# Patient Record
Sex: MALE | Race: WHITE | NOT HISPANIC OR LATINO
[De-identification: names, ages, dates, MRNs, and addresses within clinical notes are randomized per-mention and may not be internally consistent; named-entity substitution may affect disease eponyms.]

---

## 2017-04-18 ENCOUNTER — MEDICATION RENEWAL (OUTPATIENT)
Age: 22
End: 2017-04-18

## 2017-09-22 ENCOUNTER — MEDICATION RENEWAL (OUTPATIENT)
Age: 22
End: 2017-09-22

## 2018-02-20 ENCOUNTER — RX RENEWAL (OUTPATIENT)
Age: 23
End: 2018-02-20

## 2018-07-26 ENCOUNTER — APPOINTMENT (OUTPATIENT)
Dept: NEUROLOGY | Facility: CLINIC | Age: 23
End: 2018-07-26
Payer: MEDICAID

## 2018-07-26 VITALS
HEIGHT: 69 IN | BODY MASS INDEX: 35.25 KG/M2 | DIASTOLIC BLOOD PRESSURE: 76 MMHG | WEIGHT: 238 LBS | SYSTOLIC BLOOD PRESSURE: 117 MMHG | HEART RATE: 65 BPM

## 2018-07-26 PROCEDURE — 99214 OFFICE O/P EST MOD 30 MIN: CPT

## 2018-07-27 ENCOUNTER — MEDICATION RENEWAL (OUTPATIENT)
Age: 23
End: 2018-07-27

## 2018-07-30 ENCOUNTER — MEDICATION RENEWAL (OUTPATIENT)
Age: 23
End: 2018-07-30

## 2018-07-30 ENCOUNTER — MOBILE ON CALL (OUTPATIENT)
Age: 23
End: 2018-07-30

## 2018-11-26 ENCOUNTER — MEDICATION RENEWAL (OUTPATIENT)
Age: 23
End: 2018-11-26

## 2019-08-02 ENCOUNTER — MEDICATION RENEWAL (OUTPATIENT)
Age: 24
End: 2019-08-02

## 2019-12-05 ENCOUNTER — MEDICATION RENEWAL (OUTPATIENT)
Age: 24
End: 2019-12-05

## 2019-12-09 ENCOUNTER — APPOINTMENT (OUTPATIENT)
Dept: NEUROLOGY | Facility: CLINIC | Age: 24
End: 2019-12-09
Payer: MEDICAID

## 2019-12-09 VITALS — SYSTOLIC BLOOD PRESSURE: 123 MMHG | DIASTOLIC BLOOD PRESSURE: 80 MMHG | HEART RATE: 73 BPM

## 2019-12-09 PROCEDURE — 99214 OFFICE O/P EST MOD 30 MIN: CPT

## 2019-12-09 NOTE — HISTORY OF PRESENT ILLNESS
[Right Handed] : right handed [FreeTextEntry1] : 18 [FreeTextEntry2] : GTC [FreeTextEntry8] : Xanax [de-identified] : 10/2013

## 2019-12-09 NOTE — DISCUSSION/SUMMARY
[Well-controlled] : well-controlled [Generalized] : generalized  [Safety Recommendations] : The patient was advised in regards to the risk of seizures and general seizure safety recommendations including not to be bathing alone, climbing to high places and operating heavy machinery. [Risks Associated with Driving/[place] Law] : As per my usual protocol, the patient was advised in regards to risks and driving privileges associated with the [place] Guidelines.  [Risk of Death] : Risk of death associated with seizures / SUDEP was discussed. [Compliance with Medications] : The importance of compliance with medications was reinforced. [Sleep Hygiene/Sleep Disruption Risks] : Sleep hygiene and the risks of sleep disruption were discussed. [FreeTextEntry1] : 24 year old man with a GTC in the context of Xanax withdrawal. Additional  risk factor FH (father)of epilepsy Doing well\par \par Plan:\par - continue keppra 500/500\par - Counseled in length regarding  marijauna and cutting down  on cigarette use\par - reviewed seizure triggers\par - annual bloodwork\par -Bone density Dexa scan\par -follow up in one year [de-identified] : Provoked

## 2019-12-09 NOTE — PHYSICAL EXAM
[Cranial Nerves Optic (II)] : visual acuity intact bilaterally,  visual fields full to confrontation, pupils equal round and reactive to light [Cranial Nerves Trigeminal (V)] : facial sensation intact symmetrically [Cranial Nerves Oculomotor (III)] : extraocular motion intact [Cranial Nerves Glossopharyngeal (IX)] : tongue and palate midline [Cranial Nerves Accessory (XI - Cranial And Spinal)] : head turning and shoulder shrug symmetric [Cranial Nerves Facial (VII)] : face symmetrical [Cranial Nerves Hypoglossal (XII)] : there was no tongue deviation with protrusion [Motor Tone] : muscle tone was normal in all four extremities [Motor Strength] : muscle strength was normal in all four extremities [Involuntary Movements] : no involuntary movements were seen [Sensation Tactile Decrease] : light touch was intact [Abnormal Walk] : normal gait [Motor Handedness Right-Handed] : the patient is right hand dominant [2+] : Patella left 2+

## 2019-12-09 NOTE — DATA REVIEWED
[FreeTextEntry1] : 10/22/2013 hct\par 1.  No  acute  intracranial  hemorrhage,  large  cortical  infarct  or mass  effect.\par 2.  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

## 2021-06-12 ENCOUNTER — EMERGENCY (EMERGENCY)
Facility: HOSPITAL | Age: 26
LOS: 1 days | Discharge: HOME | End: 2021-06-12
Attending: STUDENT IN AN ORGANIZED HEALTH CARE EDUCATION/TRAINING PROGRAM
Payer: MEDICAID

## 2021-06-12 VITALS
DIASTOLIC BLOOD PRESSURE: 63 MMHG | HEART RATE: 93 BPM | SYSTOLIC BLOOD PRESSURE: 104 MMHG | OXYGEN SATURATION: 98 % | RESPIRATION RATE: 18 BRPM | TEMPERATURE: 99 F | HEIGHT: 70 IN | WEIGHT: 199.96 LBS

## 2021-06-12 DIAGNOSIS — Z98.89 OTHER SPECIFIED POSTPROCEDURAL STATES: Chronic | ICD-10-CM

## 2021-06-12 LAB
ALBUMIN SERPL ELPH-MCNC: 4.4 G/DL — SIGNIFICANT CHANGE UP (ref 3.3–5)
ALP SERPL-CCNC: 61 U/L — SIGNIFICANT CHANGE UP (ref 40–120)
ALT FLD-CCNC: 23 U/L — SIGNIFICANT CHANGE UP (ref 10–45)
ANION GAP SERPL CALC-SCNC: 16 MMOL/L — SIGNIFICANT CHANGE UP (ref 5–17)
AST SERPL-CCNC: 16 U/L — SIGNIFICANT CHANGE UP (ref 10–40)
BILIRUB SERPL-MCNC: 0.2 MG/DL — SIGNIFICANT CHANGE UP (ref 0.2–1.2)
BUN SERPL-MCNC: 19 MG/DL — SIGNIFICANT CHANGE UP (ref 7–23)
CALCIUM SERPL-MCNC: 9.4 MG/DL — SIGNIFICANT CHANGE UP (ref 8.4–10.5)
CHLORIDE SERPL-SCNC: 103 MMOL/L — SIGNIFICANT CHANGE UP (ref 96–108)
CO2 SERPL-SCNC: 19 MMOL/L — LOW (ref 22–31)
CREAT SERPL-MCNC: 1.03 MG/DL — SIGNIFICANT CHANGE UP (ref 0.5–1.3)
GLUCOSE SERPL-MCNC: 104 MG/DL — HIGH (ref 70–99)
MAGNESIUM SERPL-MCNC: 2.3 MG/DL — SIGNIFICANT CHANGE UP (ref 1.6–2.6)
PHOSPHATE SERPL-MCNC: 2.7 MG/DL — SIGNIFICANT CHANGE UP (ref 2.5–4.5)
POTASSIUM SERPL-MCNC: 4.1 MMOL/L — SIGNIFICANT CHANGE UP (ref 3.5–5.3)
POTASSIUM SERPL-SCNC: 4.1 MMOL/L — SIGNIFICANT CHANGE UP (ref 3.5–5.3)
PROT SERPL-MCNC: 6.8 G/DL — SIGNIFICANT CHANGE UP (ref 6–8.3)
SODIUM SERPL-SCNC: 138 MMOL/L — SIGNIFICANT CHANGE UP (ref 135–145)

## 2021-06-12 PROCEDURE — 85025 COMPLETE CBC W/AUTO DIFF WBC: CPT

## 2021-06-12 PROCEDURE — 99283 EMERGENCY DEPT VISIT LOW MDM: CPT

## 2021-06-12 PROCEDURE — 99284 EMERGENCY DEPT VISIT MOD MDM: CPT

## 2021-06-12 PROCEDURE — 80053 COMPREHEN METABOLIC PANEL: CPT

## 2021-06-12 PROCEDURE — 83735 ASSAY OF MAGNESIUM: CPT

## 2021-06-12 PROCEDURE — 84100 ASSAY OF PHOSPHORUS: CPT

## 2021-06-12 NOTE — ED ADULT TRIAGE NOTE - CHIEF COMPLAINT QUOTE
Patient c/o seizure episode at 10pm at home, witness by brother. Patient has hx of epilepsy. No seizure in 3 years.

## 2021-06-12 NOTE — ED ADULT NURSE NOTE - OBJECTIVE STATEMENT
25 year old male presents to ED via EMS s/p seizures. Pt has a history of epilepsy and reports being compliant with his medications. Around 10pm today pt had a sudden grand mal seizure lasting about 2-3 minutes. Unknown if he hit his head but did report being tired, thirsty and a headache. Pt bit his tongue during the seizure and arrived with blood on his shirt- no active bleeding upon assessment- airway clear and unobstructed. Pt last seizure was about 3 years ago. Pt denies chest pain, shortness of breath, nausea, vomiting, diarrhaea, abdominal pain, fever, chills, urinary symptoms, lightheadedness, dizziness, changes in vision

## 2021-06-13 LAB
BASOPHILS # BLD AUTO: 0.05 K/UL — SIGNIFICANT CHANGE UP (ref 0–0.2)
BASOPHILS NFR BLD AUTO: 0.3 % — SIGNIFICANT CHANGE UP (ref 0–2)
EOSINOPHIL # BLD AUTO: 0.16 K/UL — SIGNIFICANT CHANGE UP (ref 0–0.5)
EOSINOPHIL NFR BLD AUTO: 1.1 % — SIGNIFICANT CHANGE UP (ref 0–6)
HCT VFR BLD CALC: 44.5 % — SIGNIFICANT CHANGE UP (ref 39–50)
HGB BLD-MCNC: 15.1 G/DL — SIGNIFICANT CHANGE UP (ref 13–17)
IMM GRANULOCYTES NFR BLD AUTO: 0.9 % — SIGNIFICANT CHANGE UP (ref 0–1.5)
LYMPHOCYTES # BLD AUTO: 2.91 K/UL — SIGNIFICANT CHANGE UP (ref 1–3.3)
LYMPHOCYTES # BLD AUTO: 20 % — SIGNIFICANT CHANGE UP (ref 13–44)
MCHC RBC-ENTMCNC: 28.5 PG — SIGNIFICANT CHANGE UP (ref 27–34)
MCHC RBC-ENTMCNC: 33.9 GM/DL — SIGNIFICANT CHANGE UP (ref 32–36)
MCV RBC AUTO: 84 FL — SIGNIFICANT CHANGE UP (ref 80–100)
MONOCYTES # BLD AUTO: 0.68 K/UL — SIGNIFICANT CHANGE UP (ref 0–0.9)
MONOCYTES NFR BLD AUTO: 4.7 % — SIGNIFICANT CHANGE UP (ref 2–14)
NEUTROPHILS # BLD AUTO: 10.6 K/UL — HIGH (ref 1.8–7.4)
NEUTROPHILS NFR BLD AUTO: 73 % — SIGNIFICANT CHANGE UP (ref 43–77)
NRBC # BLD: 0 /100 WBCS — SIGNIFICANT CHANGE UP (ref 0–0)
PLATELET # BLD AUTO: 260 K/UL — SIGNIFICANT CHANGE UP (ref 150–400)
RBC # BLD: 5.3 M/UL — SIGNIFICANT CHANGE UP (ref 4.2–5.8)
RBC # FLD: 11.9 % — SIGNIFICANT CHANGE UP (ref 10.3–14.5)
WBC # BLD: 14.53 K/UL — HIGH (ref 3.8–10.5)
WBC # FLD AUTO: 14.53 K/UL — HIGH (ref 3.8–10.5)

## 2021-06-13 NOTE — ED PROVIDER NOTE - CLINICAL SUMMARY MEDICAL DECISION MAKING FREE TEXT BOX
26yo male with pmh epilepsy presenting with seizure.  One episode witnessed by friend with postictal states.  No signs of infection on exam.  No head injury, significant trauma.  Will check labs to r/o electrolyte abnormalities.  Monitor and reassess after labs.

## 2021-06-13 NOTE — ED PROVIDER NOTE - PHYSICAL EXAMINATION
General appearance: NAD, conversant, afebrile    HEENT: Atraumatic, + R lateral tongue bite,  MMM, clear oropharynx   Neck: Trachea midline; Full range of motion, supple, no midline tenderness   Pulm: CTA bl, normal respiratory effort and no intercostal retractions, normal work of breathing   CV: RRR, No murmurs, rubs, or gallops   Abdomen: Soft, non-tender, non-distended; no guarding or rebound   Back: No midline tenderness   Extremities: No peripheral edema    Skin: Dry, normal temperature, turgor and texture; no rash   Psych: Appropriate affect, cooperative; alert and oriented to person, place and time    Neuro: CN2-12 grossly intact, A&Ox4, MS +5/5 in UE and LE BL, finger to nose smooth and rapid, gross sensation intact in UE and LE BL, gait smooth and coordinated, negative pronator drift

## 2021-06-13 NOTE — ED PROVIDER NOTE - OBJECTIVE STATEMENT
24yo male with pmh epilepsy presenting with seizure.  States he was watching [facility] fight tonight when he had seizure witnessed by friends.  Generalized convulsions with brief confusion afterwards now back to baseline.  Described as typical for his seizures.  No fever, cough, urinary symptoms, abd pain, n/v.  Denies head injury.  At time of interview patient states he feels much better.  Last seizure 3 years ago.    Neurologist Dr Vickers

## 2021-06-13 NOTE — ED PROVIDER NOTE - PATIENT PORTAL LINK FT
You can access the FollowMyHealth Patient Portal offered by [facility] by registering at the following website: [email or web address]. By joining [facility]’s FollowMyHealth portal, you will also be able to view your health information using other applications (apps) compatible with our system.

## 2021-06-13 NOTE — ED PROVIDER NOTE - NSFOLLOWUPINSTRUCTIONS_ED_ALL_ED_FT
Follow up with your neurologist as soon as possible  Rest, drink plenty of fluids  Advance activity as tolerated  Continue all previously prescribed medications as directed  Follow up with your PMD - bring copies of your results  Return to the ER for multiple recurrent seizures, numbness, weakness, or other new or concerning symptoms

## 2021-06-13 NOTE — ED PROVIDER NOTE - ATTENDING CONTRIBUTION TO CARE
I have personally performed a face to face medical and diagnostic evaluation of the patient. I have discussed with and reviewed the Resident's note and agree with the History, ROS, Physical Exam and MDM unless otherwise indicated. A brief summary of my personal evaluation and impression can be found below.    25M hx of epilepsy presents w a cc of seizure x1 earlier this evening, reports was watching [employer] fight when had seizure witnessed by friends, generalized t/c w confusion thereafter now back to baseline, per pt this episode is c/w prior episodes however has not had seizure in some time, reports compliance w medications has q6 month visits w neuro, reports did bite tongue however did not have episode of urinary incontinence per witnessed did not have significant head trauma during episode, pt reports feels improved on ED arrival, tolerating PO. No recent fevers cough urinary sx rash sob cp or barrera.     All other ROS negative, except as above and as per HPI and ROS section.    VITALS: Initial triage and subsequent vitals have been reviewed by me.  GEN APPEARANCE: WDWN, alert, non-toxic, NAD  HEAD: Atraumatic.  MOUTH: MMM, no tonsillar erythema, swelling or exudates, protecting airway, handling secretions. Trace abrasion to R lateral aspect of tongue, no deep laceration.   EYES: PERRLa, EOMI, vision grossly intact.   NECK: Supple  CV: RRR, S1S2, no c/r/m/g. Cap refill <2 seconds. No bruits.   LUNGS: CTAB. No abnormal breath sounds.  ABDOMEN: Soft, NTND. No guarding or rebound.   MSK/EXT: No spinal or extremity point tenderness. No CVA ttp. Pelvis stable. No peripheral edema.  NEURO: Alert, follows commands. Weight bearing normal. Speech normal. Sensation and motor normal x4 extremities. CN2-12 normal, coordination normal, ambulating normally.  SKIN: Warm, dry and intact. No rash.  PSYCH: Appropriate    Plan/MDM: 25M hx of epilepsy presents w a cc of seizure x1 earlier this evening, reports was watching [employer] fight when had seizure witnessed by friends, generalized t/c w confusion thereafter now back to baseline, per pt this episode is c/w prior episodes however has not had seizure in some time, reports compliance w medications has q6 month visits w neuro, reports did bite tongue however did not have episode of urinary incontinence per witnessed did not have significant head trauma during episode, pt reports feels improved on ED arrival, tolerating PO exam vss non toxic neuro non focal exam ddx c/f likely seizure in known epilepsy pt plan to check labs pt denies recent infectious sx, low c/f severe head trauma as event witnessed and pt well appearing now, mother and pt in agreement w plan to check labs and have close follow up w neuro outpt, mother and pt comfortable w plan for dc thereafter pending labs.

## 2021-06-21 ENCOUNTER — APPOINTMENT (OUTPATIENT)
Dept: NEUROLOGY | Facility: CLINIC | Age: 26
End: 2021-06-21
Payer: MEDICAID

## 2021-06-21 VITALS
HEIGHT: 69 IN | HEART RATE: 69 BPM | SYSTOLIC BLOOD PRESSURE: 120 MMHG | DIASTOLIC BLOOD PRESSURE: 82 MMHG | BODY MASS INDEX: 33.47 KG/M2 | WEIGHT: 226 LBS

## 2021-06-21 PROCEDURE — 99213 OFFICE O/P EST LOW 20 MIN: CPT

## 2021-06-21 NOTE — HISTORY OF PRESENT ILLNESS
[Right Handed] : right handed [FreeTextEntry1] : 18 [FreeTextEntry2] : GTC [FreeTextEntry8] : Xanax [de-identified] : 10/2013

## 2021-06-21 NOTE — DISCUSSION/SUMMARY
[Well-controlled] : well-controlled [Generalized] : generalized  [Risks Associated with D[place] Law] : As per my usual protocol, the patient was advised in regards to risks and driving privileges associated with the [place] Guidelines.  [Safety Recommendations] : The patient was advised in regards to the risk of seizures and general seizure safety recommendations including not to be bathing alone, climbing to high places and operating heavy machinery. [Compliance with Medications] : The importance of compliance with medications was reinforced. [Sleep Hygiene/Sleep Disruption Risks] : Sleep hygiene and the risks of sleep disruption were discussed. [Risk of Death] : Risk of death associated with seizures / SUDEP was discussed. [FreeTextEntry1] : 24 year old man with a GTC in the context of Xanax withdrawal. Additional  risk factor FH (father)of epilepsy Doing well\par \par Plan:\par - continue keppra 500/500, Divlaproex 750mg BID\par - Counseled in length regarding  mariijuana and cutting down  on cigarette use\par - reviewed seizure triggers i.e ETOH consumption, AED compliance (will use pill bis with alarm)\par - annual bloodwork\par -Bone density Dexa scan\par -follow up in one year [de-identified] : Provoked

## 2021-06-21 NOTE — PHYSICAL EXAM
[Cranial Nerves Optic (II)] : visual acuity intact bilaterally,  visual fields full to confrontation, pupils equal round and reactive to light [Cranial Nerves Oculomotor (III)] : extraocular motion intact [Cranial Nerves Trigeminal (V)] : facial sensation intact symmetrically [Cranial Nerves Facial (VII)] : face symmetrical [Cranial Nerves Glossopharyngeal (IX)] : tongue and palate midline [Cranial Nerves Accessory (XI - Cranial And Spinal)] : head turning and shoulder shrug symmetric [Cranial Nerves Hypoglossal (XII)] : there was no tongue deviation with protrusion [Motor Tone] : muscle tone was normal in all four extremities [Motor Strength] : muscle strength was normal in all four extremities [Involuntary Movements] : no involuntary movements were seen [Motor Handedness Right-Handed] : the patient is right hand dominant [Sensation Tactile Decrease] : light touch was intact [Abnormal Walk] : normal gait [2+] : Ankle jerk left 2+

## 2021-12-21 ENCOUNTER — APPOINTMENT (OUTPATIENT)
Dept: NEUROLOGY | Facility: CLINIC | Age: 26
End: 2021-12-21

## 2022-10-16 ENCOUNTER — RX RENEWAL (OUTPATIENT)
Age: 27
End: 2022-10-16

## 2024-02-07 ENCOUNTER — APPOINTMENT (OUTPATIENT)
Dept: NEUROLOGY | Facility: CLINIC | Age: 29
End: 2024-02-07
Payer: MEDICAID

## 2024-02-07 VITALS
BODY MASS INDEX: 31.84 KG/M2 | HEART RATE: 64 BPM | DIASTOLIC BLOOD PRESSURE: 86 MMHG | HEIGHT: 69 IN | SYSTOLIC BLOOD PRESSURE: 135 MMHG | WEIGHT: 215 LBS

## 2024-02-07 PROCEDURE — 99215 OFFICE O/P EST HI 40 MIN: CPT

## 2024-02-07 NOTE — REASON FOR VISIT
[Follow-Up: _____] : a [unfilled] follow-up visit [Parent] : parent Strong peripheral pulses/Capillary refill less/equal to 2 seconds

## 2024-02-07 NOTE — DISCUSSION/SUMMARY
[Well-controlled] : well-controlled [Generalized] : generalized  [[place]isks [place] with D[place]] : As per my usual protocol, the patient was advised in regards to risks and driving privileges associated with the [place].  [Safety Recommendations] : The patient was advised in regards to the risk of seizures and general seizure safety recommendations including not to be bathing alone, climbing to high places and operating heavy machinery. [Compliance with Medications] : The importance of compliance with medications was reinforced. [Sleep Hygiene/Sleep Disruption Risks] : Sleep hygiene and the risks of sleep disruption were discussed. [Risk of Death] : Risk of death associated with seizures / SUDEP was discussed. [FreeTextEntry1] : 28 year old man with a GTC in the context of Xanax withdrawal. Additional  risk factor FH (father)of epilepsy Doing well Patient has not used illicit substances in years  Plan: - switch to ER version of Levetiracetam and Divalproex since patient is adamant about continuing medication once daily - Divalproex 750 mg in am and Levetiracetam 500 mg in am  - Counseled in length regarding  marijuana and cutting down  on cigarette use - reviewed seizure triggers i.e ETOH consumption, -annual labs including VPA level/Levetiracetam levels  ,BMP, CBC and hepatic panel   Bone density Dexa scan -follow up in 6-9 months [de-identified] : Provoked

## 2024-02-07 NOTE — HISTORY OF PRESENT ILLNESS
[Right Handed] : right handed [FreeTextEntry1] : 18 [FreeTextEntry2] : GTC [FreeTextEntry8] : Xanax [de-identified] : 10/2013

## 2024-09-09 DIAGNOSIS — Z00.00 ENCOUNTER FOR GENERAL ADULT MEDICAL EXAMINATION W/OUT ABNORMAL FINDINGS: ICD-10-CM

## 2024-09-09 DIAGNOSIS — R56.9 UNSPECIFIED CONVULSIONS: ICD-10-CM

## 2024-09-09 DIAGNOSIS — Z51.81 ENCOUNTER FOR THERAPEUTIC DRUG LVL MONITORING: ICD-10-CM

## 2024-09-10 ENCOUNTER — APPOINTMENT (OUTPATIENT)
Dept: NEUROLOGY | Facility: CLINIC | Age: 29
End: 2024-09-10

## 2024-09-12 NOTE — HISTORY OF PRESENT ILLNESS
[Right Handed] : right handed [FreeTextEntry1] : 18 [FreeTextEntry2] : GTC [FreeTextEntry8] : Xanax [de-identified] : 10/2013

## 2024-09-12 NOTE — DISCUSSION/SUMMARY
[Well-controlled] : well-controlled [Generalized] : generalized  [[place]isks [place] with D[place]] : As per my usual protocol, the patient was advised in regards to risks and driving privileges associated with the [place].  [Safety Recommendations] : The patient was advised in regards to the risk of seizures and general seizure safety recommendations including not to be bathing alone, climbing to high places and operating heavy machinery. [Compliance with Medications] : The importance of compliance with medications was reinforced. [Sleep Hygiene/Sleep Disruption Risks] : Sleep hygiene and the risks of sleep disruption were discussed. [Risk of Death] : Risk of death associated with seizures / SUDEP was discussed. [FreeTextEntry1] : 28 year old man with a GTC in the context of Xanax withdrawal. Additional  risk factor FH (father)of epilepsy Doing well Patient has not used illicit substances in years  Plan: - switch to ER version of Levetiracetam and Divalproex since patient is adamant about continuing medication once daily - Divalproex 750 mg in am and Levetiracetam 500 mg in am  - Counseled in length regarding  marijuana and cutting down  on cigarette use - reviewed seizure triggers i.e ETOH consumption, -annual labs including VPA level/Levetiracetam levels  ,BMP, CBC and hepatic panel   Bone density Dexa scan -follow up in 6-9 months [de-identified] : Provoked

## 2024-10-30 ENCOUNTER — APPOINTMENT (OUTPATIENT)
Dept: NEUROLOGY | Facility: CLINIC | Age: 29
End: 2024-10-30

## 2024-10-30 VITALS
BODY MASS INDEX: 29.62 KG/M2 | HEIGHT: 69 IN | WEIGHT: 200 LBS | SYSTOLIC BLOOD PRESSURE: 131 MMHG | HEART RATE: 60 BPM | DIASTOLIC BLOOD PRESSURE: 73 MMHG

## 2024-10-30 DIAGNOSIS — G40.B09 JUVENILE MYOCLONIC EPILEPSY, NOT INTRACTABLE, W/OUT STATUS EPILEPTICUS: ICD-10-CM

## 2024-10-30 PROCEDURE — 99205 OFFICE O/P NEW HI 60 MIN: CPT

## 2024-10-30 RX ORDER — DIVALPROEX SODIUM 500 1/1
500 TABLET, EXTENDED RELEASE ORAL
Qty: 60 | Refills: 4 | Status: ACTIVE | COMMUNITY
Start: 2024-10-30 | End: 1900-01-01

## 2024-10-30 RX ORDER — DIVALPROEX SODIUM 250 MG/1
250 TABLET, EXTENDED RELEASE ORAL TWICE DAILY
Qty: 60 | Refills: 5 | Status: ACTIVE | COMMUNITY
Start: 2024-10-30 | End: 1900-01-01

## 2024-11-18 PROBLEM — Z78.9 MEDICATION INTOLERANCE: Status: ACTIVE | Noted: 2024-11-18

## 2024-12-30 ENCOUNTER — APPOINTMENT (OUTPATIENT)
Dept: NEUROLOGY | Facility: CLINIC | Age: 29
End: 2024-12-30
Payer: MEDICAID

## 2024-12-30 VITALS
DIASTOLIC BLOOD PRESSURE: 82 MMHG | HEIGHT: 70 IN | SYSTOLIC BLOOD PRESSURE: 141 MMHG | WEIGHT: 250 LBS | HEART RATE: 66 BPM | BODY MASS INDEX: 35.79 KG/M2

## 2024-12-30 DIAGNOSIS — R56.9 UNSPECIFIED CONVULSIONS: ICD-10-CM

## 2024-12-30 DIAGNOSIS — G40.B09 JUVENILE MYOCLONIC EPILEPSY, NOT INTRACTABLE, W/OUT STATUS EPILEPTICUS: ICD-10-CM

## 2024-12-30 PROCEDURE — 99214 OFFICE O/P EST MOD 30 MIN: CPT

## 2025-07-12 ENCOUNTER — NON-APPOINTMENT (OUTPATIENT)
Age: 30
End: 2025-07-12

## 2025-07-14 ENCOUNTER — APPOINTMENT (OUTPATIENT)
Dept: NEUROLOGY | Facility: CLINIC | Age: 30
End: 2025-07-14

## 2025-07-30 ENCOUNTER — APPOINTMENT (OUTPATIENT)
Dept: NEUROLOGY | Facility: CLINIC | Age: 30
End: 2025-07-30
Payer: MEDICAID

## 2025-07-30 VITALS
HEART RATE: 64 BPM | BODY MASS INDEX: 30.78 KG/M2 | WEIGHT: 215 LBS | HEIGHT: 70 IN | DIASTOLIC BLOOD PRESSURE: 83 MMHG | SYSTOLIC BLOOD PRESSURE: 129 MMHG

## 2025-07-30 DIAGNOSIS — G40.B09 JUVENILE MYOCLONIC EPILEPSY, NOT INTRACTABLE, W/OUT STATUS EPILEPTICUS: ICD-10-CM

## 2025-07-30 DIAGNOSIS — R56.9 UNSPECIFIED CONVULSIONS: ICD-10-CM

## 2025-07-30 DIAGNOSIS — Z79.899 OTHER LONG TERM (CURRENT) DRUG THERAPY: ICD-10-CM

## 2025-07-30 PROCEDURE — G2211 COMPLEX E/M VISIT ADD ON: CPT | Mod: NC

## 2025-07-30 PROCEDURE — 99213 OFFICE O/P EST LOW 20 MIN: CPT
